# Patient Record
Sex: MALE | Race: WHITE | NOT HISPANIC OR LATINO | Employment: OTHER | URBAN - METROPOLITAN AREA
[De-identification: names, ages, dates, MRNs, and addresses within clinical notes are randomized per-mention and may not be internally consistent; named-entity substitution may affect disease eponyms.]

---

## 2023-02-24 DIAGNOSIS — K40.91 UNILATERAL RECURRENT INGUINAL HERNIA: Primary | ICD-10-CM

## 2023-03-07 ENCOUNTER — OFFICE VISIT (OUTPATIENT)
Dept: SURGERY | Facility: CLINIC | Age: 58
End: 2023-03-07
Payer: MEDICAID

## 2023-03-07 VITALS
HEIGHT: 67 IN | BODY MASS INDEX: 22.91 KG/M2 | SYSTOLIC BLOOD PRESSURE: 126 MMHG | RESPIRATION RATE: 19 BRPM | OXYGEN SATURATION: 100 % | TEMPERATURE: 98 F | HEART RATE: 71 BPM | DIASTOLIC BLOOD PRESSURE: 86 MMHG | WEIGHT: 146 LBS

## 2023-03-07 DIAGNOSIS — Z01.818 PRE-OP TESTING: ICD-10-CM

## 2023-03-07 DIAGNOSIS — K40.90 INGUINAL HERNIA OF RIGHT SIDE WITHOUT OBSTRUCTION OR GANGRENE: ICD-10-CM

## 2023-03-07 PROCEDURE — 99214 OFFICE O/P EST MOD 30 MIN: CPT | Mod: PBBFAC | Performed by: SURGERY

## 2023-03-07 RX ORDER — SODIUM CHLORIDE 9 MG/ML
INJECTION, SOLUTION INTRAVENOUS CONTINUOUS
Status: SHIPPED | OUTPATIENT
Start: 2023-03-07

## 2023-03-07 RX ORDER — ONDANSETRON 2 MG/ML
4 INJECTION INTRAMUSCULAR; INTRAVENOUS EVERY 12 HOURS PRN
Status: SHIPPED | OUTPATIENT
Start: 2023-03-07

## 2023-03-07 RX ORDER — HEPARIN SODIUM 5000 [USP'U]/ML
5000 INJECTION, SOLUTION INTRAVENOUS; SUBCUTANEOUS
Status: SHIPPED | OUTPATIENT
Start: 2023-03-07 | End: 2023-03-08

## 2023-03-07 NOTE — PROGRESS NOTES
[facility]/General Surgery  History & Physical  Chief Complaint:   R inguinal hernia     History of Present Illness:  Vik Riojas is a 57 y.o. male who presents to clinic for evaluation of R inguinal hernia. Pt first noticed it in October 2020 when he was straining during a BM and felt a tear. Hernia has progressed in size since then and is descended down into his scrotum. Pt regularly and easily reduces it. Only experiences pain and nausea when he eats food that cause him to bloat. When this happens, he says the herniation almost feels like it is caught and does not go down into his scrotum. Pt finds himself reducing or holding the hernia multiple times during the day. Occasionally has to push on the hernia when having a BM. Denies change in bowel habits/blood in stool.     Review of Systems:  Negative other than stated in HPI on 10 point review of systems.     Allergies:  Review of patient's allergies indicates:  No Known Allergies    Home Medications:  No current outpatient medications on file prior to visit.     No current facility-administered medications on file prior to visit.       Past Medical History:  History reviewed. No pertinent past medical history.    Past Surgical History:  History reviewed. No pertinent surgical history.    Family History:   History reviewed. No pertinent family history.    Social History:   Social History     Tobacco Use    Smoking status: Never     Passive exposure: Never    Smokeless tobacco: Never        Vital Signs:  Temp: 98.2 °F (36.8 °C) (03/07/23 1059)  Pulse: 71 (03/07/23 1059)  Resp: 19 (03/07/23 1059)  BP: 126/86 (03/07/23 1059)  SpO2: 100 % (03/07/23 1059)    Physical Exam:  General: well developed, well nourished, no distress  HEENT: normocephalic, atraumatic, hearing grossly normal bilaterally, mucous membranes moist, no scleral icterus  Neck: supple, symmetrical, trachea midline, no JVD  Lungs: and normal respiratory effort    Extremities: no cyanosis or  edema, or clubbing  Abdomen: soft, non-tender to palpation, no distention, no masses; bilateral inguinal hernias present R>L; R hernia descends down into scrotum; easily reducible   Skin: Skin color, texture, turgor normal. No rashes or lesions  Musculoskeletal: no clubbing, cyanosis, no deformities  Neurologic: No focal numbness or weakness  Psych/Behavioral:  Alert and oriented, appropriate affect.    Laboratory:  No results for input(s): WBC, HGB, HCT, PLT, NA, K, CL, CO2, BUN, CREATININE, BILITOT, AST, ALT, ALKPHOS, CALCIUM, ALBUMIN, PROT, MG, PHOS in the last 72 hours.      ASSESSMENT:     Vik Riojas is a 57 y.o. male who presents to clinic for evaluation of R inguinal hernia. Pt wishes to pursue operative management.    PLAN:     - plan for open inguinal hernia repair on 3/20/23  - consents obtained in clinic  - advised pt on the signs and symptoms of hernia incarceration and to go to the ED if he finds the hernia to no longer be reducible/experiencing increased abdominal pain    Jane Kelly  [facility] General Surgery  11:54 AM    RESIDENT ATTESTATION     I have seen and  evaluated the patient with the student doctor. Agree with above note and the necessary changes have been made. Agree w/ assessment and plan with following changes:     Assessment/Plan:  57 y.o. male who presents to clinic for evaluation of R inguinal hernia. Large RIH present, protruding into scrotum but easily reducible. Small left inguinal hernia also present.     Will plan for open RIHR on 3/20/23 and LIHR at  a later date.         Hunter Chang MD  PGY-1   [facility] General Surgery

## 2023-03-07 NOTE — H&P (VIEW-ONLY)
[facility]/General Surgery  History & Physical  Chief Complaint:   R inguinal hernia     History of Present Illness:  Vik Riojas is a 57 y.o. male who presents to clinic for evaluation of R inguinal hernia. Pt first noticed it in October 2020 when he was straining during a BM and felt a tear. Hernia has progressed in size since then and is descended down into his scrotum. Pt regularly and easily reduces it. Only experiences pain and nausea when he eats food that cause him to bloat. When this happens, he says the herniation almost feels like it is caught and does not go down into his scrotum. Pt finds himself reducing or holding the hernia multiple times during the day. Occasionally has to push on the hernia when having a BM. Denies change in bowel habits/blood in stool.     Review of Systems:  Negative other than stated in HPI on 10 point review of systems.     Allergies:  Review of patient's allergies indicates:  No Known Allergies    Home Medications:  No current outpatient medications on file prior to visit.     No current facility-administered medications on file prior to visit.       Past Medical History:  History reviewed. No pertinent past medical history.    Past Surgical History:  History reviewed. No pertinent surgical history.    Family History:   History reviewed. No pertinent family history.    Social History:   Social History     Tobacco Use    Smoking status: Never     Passive exposure: Never    Smokeless tobacco: Never        Vital Signs:  Temp: 98.2 °F (36.8 °C) (03/07/23 1059)  Pulse: 71 (03/07/23 1059)  Resp: 19 (03/07/23 1059)  BP: 126/86 (03/07/23 1059)  SpO2: 100 % (03/07/23 1059)    Physical Exam:  General: well developed, well nourished, no distress  HEENT: normocephalic, atraumatic, hearing grossly normal bilaterally, mucous membranes moist, no scleral icterus  Neck: supple, symmetrical, trachea midline, no JVD  Lungs: and normal respiratory effort    Extremities: no cyanosis or  edema, or clubbing  Abdomen: soft, non-tender to palpation, no distention, no masses; bilateral inguinal hernias present R>L; R hernia descends down into scrotum; easily reducible   Skin: Skin color, texture, turgor normal. No rashes or lesions  Musculoskeletal: no clubbing, cyanosis, no deformities  Neurologic: No focal numbness or weakness  Psych/Behavioral:  Alert and oriented, appropriate affect.    Laboratory:  No results for input(s): WBC, HGB, HCT, PLT, NA, K, CL, CO2, BUN, CREATININE, BILITOT, AST, ALT, ALKPHOS, CALCIUM, ALBUMIN, PROT, MG, PHOS in the last 72 hours.      ASSESSMENT:     Vik Riojas is a 57 y.o. male who presents to clinic for evaluation of R inguinal hernia. Pt wishes to pursue operative management.    PLAN:     - plan for open inguinal hernia repair on 3/20/23  - consents obtained in clinic  - advised pt on the signs and symptoms of hernia incarceration and to go to the ED if he finds the hernia to no longer be reducible/experiencing increased abdominal pain    Jane Kelly  \[facility] [place] [facility]\"" General Surgery  11:54 AM    RESIDENT ATTESTATION     I have seen and  evaluated the patient with the student doctor. Agree with above note and the necessary changes have been made. Agree w/ assessment and plan with following changes:     Assessment/Plan:  57 y.o. male who presents to clinic for evaluation of R inguinal hernia. Large RIH present, protruding into scrotum but easily reducible. Small left inguinal hernia also present.     Will plan for open RIHR on 3/20/23 and LIHR at  a later date.         Hunter Chang MD  PGY-1   [facility] General Surgery

## 2023-03-07 NOTE — PROGRESS NOTES
I have reviewed the notes, assessments, and/or procedures performed by the resident, I concur with her/his documentation of Vik Riojas.     Mary Ellen Fritz MD

## 2023-03-07 NOTE — PROGRESS NOTES
[facility]/General Surgery  History & Physical  Chief Complaint:   R inguinal hernia     History of Present Illness:  Vik Riojas is a 57 y.o. male who presents to clinic for evaluation of R inguinal hernia. Pt first noticed it in October 2020 when he was straining during a BM and felt a tear. Hernia has progressed in size since then and is descended down into his scrotum. Pt regularly and easily reduces it. Only experiences pain and nausea when he eats food that cause him to bloat. When this happens, he says the herniation almost feels like it is caught and does not go down into his scrotum. Pt finds himself reducing or holding the hernia multiple times during the day. Occasionally has to push on the hernia when having a BM. Denies change in bowel habits/blood in stool.     Review of Systems:  Negative other than stated in HPI on 10 point review of systems.     Allergies:  Review of patient's allergies indicates:  No Known Allergies    Home Medications:  No current outpatient medications on file prior to visit.     No current facility-administered medications on file prior to visit.       Past Medical History:  History reviewed. No pertinent past medical history.    Past Surgical History:  History reviewed. No pertinent surgical history.    Family History:   History reviewed. No pertinent family history.    Social History:   Social History     Tobacco Use    Smoking status: Never     Passive exposure: Never    Smokeless tobacco: Never        Vital Signs:  Temp: 98.2 °F (36.8 °C) (03/07/23 1059)  Pulse: 71 (03/07/23 1059)  Resp: 19 (03/07/23 1059)  BP: 126/86 (03/07/23 1059)  SpO2: 100 % (03/07/23 1059)    Physical Exam:  General: well developed, well nourished, no distress  HEENT: normocephalic, atraumatic, hearing grossly normal bilaterally, mucous membranes moist, no scleral icterus  Neck: supple, symmetrical, trachea midline, no JVD  Lungs: and normal respiratory effort    Extremities: no cyanosis or  edema, or clubbing  Abdomen: soft, non-tender to palpation, no distention, no masses; bilateral inguinal hernias present R>L; R hernia descends down into scrotum; easily reducible   Skin: Skin color, texture, turgor normal. No rashes or lesions  Musculoskeletal: no clubbing, cyanosis, no deformities  Neurologic: No focal numbness or weakness  Psych/Behavioral:  Alert and oriented, appropriate affect.    Laboratory:  No results for input(s): WBC, HGB, HCT, PLT, NA, K, CL, CO2, BUN, CREATININE, BILITOT, AST, ALT, ALKPHOS, CALCIUM, ALBUMIN, PROT, MG, PHOS in the last 72 hours.      ASSESSMENT:     Vik Riojas is a 57 y.o. male who presents to clinic for evaluation of R inguinal hernia. Pt wishes to pursue operative management.    PLAN:     - plan for open inguinal hernia repair on 3/20/23  - consents obtained in clinic  - advised pt on the signs and symptoms of hernia incarceration and to go to the ED if he finds the hernia to no longer be reducible/experiencing increased abdominal pain    Jane Kelly  [facility] General Surgery  11:54 AM    RESIDENT ATTESTATION     I have seen and  evaluated the patient with the student doctor. Agree with above note and the necessary changes have been made. Agree w/ assessment and plan with following changes:     Assessment/Plan:  57 y.o. male who presents to clinic for evaluation of R inguinal hernia. Large RIH present, protruding into scrotum but easily reducible. Small left inguinal hernia also present.     Will plan for open RIHR on 3/20/23 and LIHR at  a later date.         Hunter Chang MD  PGY-1   [facility] General Surgery

## 2023-03-07 NOTE — PROGRESS NOTES
Patient seen by Dr. Chnag. Patient instructed to RTC 2 weeks for post op after surgery on March 20th.

## 2023-03-15 ENCOUNTER — ANESTHESIA EVENT (OUTPATIENT)
Dept: SURGERY | Facility: HOSPITAL | Age: 58
End: 2023-03-15
Payer: MEDICAID

## 2023-03-15 NOTE — ANESTHESIA PREPROCEDURE EVALUATION
03/15/2023  Vik Riojas is a 57 y.o., male for open right inguinal hernia repair      No past medical or surgical history       Vitals:    03/20/23 0900 03/20/23 0917 03/20/23 0926 03/20/23 0930   BP:   126/83 126/83   Pulse:   90    Temp:   36.8 °C (98.2 °F)    TempSrc:   Oral    SpO2:   98%    Weight: 68.1 kg (150 lb 3.2 oz) 68.3 kg (150 lb 9.6 oz)         Social History     Socioeconomic History    Marital status:    Tobacco Use    Smoking status: Never     Passive exposure: Never    Smokeless tobacco: Never     No data found. Enter data into the [facility]  navigator to see it here..      No results found for: WBC, HGB, HCT, PLT, CHOL, TRIG, HDL, LDLDIRECT, ALT, AST, NA, K, CL, CREATININE, BUN, CO2, TSH, PSA, INR, GLUF, HGBA1C, MICROALBUR    No past surgical history on file.    Pre-op Assessment    I have reviewed the Patient Summary Reports.     I have reviewed the Nursing Notes. I have reviewed the NPO Status.   I have reviewed the Medications.     Review of Systems  Anesthesia Hx:  No problems with previous Anesthesia  History of prior surgery of interest to airway management or planning: Denies Family Hx of Anesthesia complications.   Denies Personal Hx of Anesthesia complications.   Hematology/Oncology:  Hematology Normal   Oncology Normal     EENT/Dental:EENT/Dental Normal   Cardiovascular:  Cardiovascular Normal     Pulmonary:  Pulmonary Normal    Renal/:  Renal/ Normal     Hepatic/GI:  Hepatic/GI Normal    Musculoskeletal:  Musculoskeletal Normal    Neurological:  Neurology Normal    Endocrine:  Endocrine Normal    Dermatological:  Skin Normal    Psych:  Psychiatric Normal           Physical Exam  General: Well nourished, Cooperative, Alert and Oriented    Airway:  Mallampati: I / I  Mouth Opening: Normal  TM Distance: Normal  Tongue: Normal  Neck ROM: Normal  ROM    Dental:  Intact        Anesthesia Plan  Type of Anesthesia, risks & benefits discussed:    Anesthesia Type: Gen Supraglottic Airway  Intra-op Monitoring Plan: Standard ASA Monitors  Post Op Pain Control Plan: multimodal analgesia and IV/PO Opioids PRN  Induction:  IV  Airway Plan: Direct  Informed Consent: Informed consent signed with the Patient and all parties understand the risks and agree with anesthesia plan.  All questions answered. Patient consented to blood products? No  ASA Score: 1  Day of Surgery Review of History & Physical: H&P Update referred to the surgeon/provider.    Ready For Surgery From Anesthesia Perspective.     .

## 2023-03-16 RX ORDER — POLYETHYLENE GLYCOL 3350 17 G/17G
POWDER, FOR SOLUTION ORAL
COMMUNITY
Start: 2023-03-01

## 2023-03-16 RX ORDER — BISACODYL 5 MG
5 TABLET, DELAYED RELEASE (ENTERIC COATED) ORAL DAILY PRN
COMMUNITY
Start: 2018-01-16

## 2023-03-16 RX ORDER — IBUPROFEN 100 MG/5ML
1000 SUSPENSION, ORAL (FINAL DOSE FORM) ORAL DAILY
COMMUNITY

## 2023-03-16 RX ORDER — CHOLECALCIFEROL (VITAMIN D3) 25 MCG
1000 TABLET ORAL DAILY
COMMUNITY

## 2023-03-17 ENCOUNTER — PATIENT MESSAGE (OUTPATIENT)
Dept: ADMINISTRATIVE | Facility: OTHER | Age: 58
End: 2023-03-17
Payer: MEDICAID

## 2023-03-20 ENCOUNTER — ANESTHESIA (OUTPATIENT)
Dept: SURGERY | Facility: HOSPITAL | Age: 58
End: 2023-03-20
Payer: MEDICAID

## 2023-03-20 ENCOUNTER — HOSPITAL ENCOUNTER (OUTPATIENT)
Facility: HOSPITAL | Age: 58
Discharge: HOME | End: 2023-03-20
Attending: SURGERY | Admitting: SURGERY
Payer: MEDICAID

## 2023-03-20 DIAGNOSIS — K40.90 INGUINAL HERNIA OF RIGHT SIDE WITHOUT OBSTRUCTION OR GANGRENE: Primary | ICD-10-CM

## 2023-03-20 DIAGNOSIS — K40.91 UNILATERAL RECURRENT INGUINAL HERNIA: ICD-10-CM

## 2023-03-20 DIAGNOSIS — K40.90 INGUINAL HERNIA: ICD-10-CM

## 2023-03-20 PROCEDURE — 25000003 PHARM REV CODE 250: Performed by: SPECIALIST

## 2023-03-20 PROCEDURE — 63600175 PHARM REV CODE 636 W HCPCS

## 2023-03-20 PROCEDURE — C1729 CATH, DRAINAGE: HCPCS | Performed by: SURGERY

## 2023-03-20 PROCEDURE — 36000706: Performed by: SURGERY

## 2023-03-20 PROCEDURE — 88302 TISSUE EXAM BY PATHOLOGIST: CPT | Mod: TC | Performed by: SURGERY

## 2023-03-20 PROCEDURE — 63600175 PHARM REV CODE 636 W HCPCS: Performed by: SPECIALIST

## 2023-03-20 PROCEDURE — 63600175 PHARM REV CODE 636 W HCPCS: Performed by: NURSE ANESTHETIST, CERTIFIED REGISTERED

## 2023-03-20 PROCEDURE — 25000003 PHARM REV CODE 250: Performed by: NURSE ANESTHETIST, CERTIFIED REGISTERED

## 2023-03-20 PROCEDURE — 37000008 HC ANESTHESIA 1ST 15 MINUTES: Performed by: SURGERY

## 2023-03-20 PROCEDURE — 71000033 HC RECOVERY, INTIAL HOUR: Performed by: SURGERY

## 2023-03-20 PROCEDURE — 71000016 HC POSTOP RECOV ADDL HR: Performed by: SURGERY

## 2023-03-20 PROCEDURE — 00830 ANES HERNIA RPR LWR ABD NOS: CPT | Performed by: SURGERY

## 2023-03-20 PROCEDURE — 71000015 HC POSTOP RECOV 1ST HR: Performed by: SURGERY

## 2023-03-20 PROCEDURE — 37000009 HC ANESTHESIA EA ADD 15 MINS: Performed by: SURGERY

## 2023-03-20 PROCEDURE — 36000707: Performed by: SURGERY

## 2023-03-20 PROCEDURE — C1781 MESH (IMPLANTABLE): HCPCS | Performed by: SURGERY

## 2023-03-20 PROCEDURE — 25000003 PHARM REV CODE 250: Performed by: SURGERY

## 2023-03-20 DEVICE — MESH KNITTED ST 3 X 6: Type: IMPLANTABLE DEVICE | Site: GROIN | Status: FUNCTIONAL

## 2023-03-20 RX ORDER — SODIUM CHLORIDE, SODIUM LACTATE, POTASSIUM CHLORIDE, CALCIUM CHLORIDE 600; 310; 30; 20 MG/100ML; MG/100ML; MG/100ML; MG/100ML
INJECTION, SOLUTION INTRAVENOUS CONTINUOUS
Status: ACTIVE | OUTPATIENT
Start: 2023-03-20

## 2023-03-20 RX ORDER — KETOROLAC TROMETHAMINE 30 MG/ML
INJECTION, SOLUTION INTRAMUSCULAR; INTRAVENOUS
Status: DISCONTINUED | OUTPATIENT
Start: 2023-03-20 | End: 2023-03-20

## 2023-03-20 RX ORDER — PROPOFOL 10 MG/ML
INJECTION, EMULSION INTRAVENOUS
Status: DISCONTINUED | OUTPATIENT
Start: 2023-03-20 | End: 2023-03-20

## 2023-03-20 RX ORDER — MIDAZOLAM HYDROCHLORIDE 1 MG/ML
2 INJECTION INTRAMUSCULAR; INTRAVENOUS ONCE
Status: COMPLETED | OUTPATIENT
Start: 2023-03-20 | End: 2023-03-20

## 2023-03-20 RX ORDER — HEPARIN SODIUM 5000 [USP'U]/ML
5000 INJECTION, SOLUTION INTRAVENOUS; SUBCUTANEOUS
Status: COMPLETED | OUTPATIENT
Start: 2023-03-20 | End: 2023-03-20

## 2023-03-20 RX ORDER — HYDROMORPHONE HYDROCHLORIDE 1 MG/ML
INJECTION, SOLUTION INTRAMUSCULAR; INTRAVENOUS; SUBCUTANEOUS
Status: DISCONTINUED
Start: 2023-03-20 | End: 2023-03-20 | Stop reason: HOSPADM

## 2023-03-20 RX ORDER — DIPHENHYDRAMINE HYDROCHLORIDE 50 MG/ML
25 INJECTION INTRAMUSCULAR; INTRAVENOUS ONCE AS NEEDED
Status: ACTIVE | OUTPATIENT
Start: 2023-03-20 | End: 2034-08-16

## 2023-03-20 RX ORDER — SODIUM CHLORIDE 9 MG/ML
INJECTION, SOLUTION INTRAVENOUS CONTINUOUS
Status: DISCONTINUED | OUTPATIENT
Start: 2023-03-20 | End: 2023-03-20 | Stop reason: HOSPADM

## 2023-03-20 RX ORDER — BUPIVACAINE HYDROCHLORIDE AND EPINEPHRINE 5; 5 MG/ML; UG/ML
INJECTION, SOLUTION EPIDURAL; INTRACAUDAL; PERINEURAL
Status: DISCONTINUED
Start: 2023-03-20 | End: 2023-03-20 | Stop reason: HOSPADM

## 2023-03-20 RX ORDER — PROCHLORPERAZINE EDISYLATE 5 MG/ML
5 INJECTION INTRAMUSCULAR; INTRAVENOUS ONCE AS NEEDED
Status: ACTIVE | OUTPATIENT
Start: 2023-03-20 | End: 2034-08-16

## 2023-03-20 RX ORDER — FENTANYL CITRATE 50 UG/ML
INJECTION, SOLUTION INTRAMUSCULAR; INTRAVENOUS
Status: DISCONTINUED | OUTPATIENT
Start: 2023-03-20 | End: 2023-03-20

## 2023-03-20 RX ORDER — MIDAZOLAM HYDROCHLORIDE 1 MG/ML
INJECTION INTRAMUSCULAR; INTRAVENOUS
Status: DISCONTINUED
Start: 2023-03-20 | End: 2023-03-20 | Stop reason: HOSPADM

## 2023-03-20 RX ORDER — LIDOCAINE HCL/PF 100 MG/5ML
SYRINGE (ML) INTRAVENOUS
Status: DISCONTINUED | OUTPATIENT
Start: 2023-03-20 | End: 2023-03-20

## 2023-03-20 RX ORDER — DEXAMETHASONE SODIUM PHOSPHATE 4 MG/ML
INJECTION, SOLUTION INTRA-ARTICULAR; INTRALESIONAL; INTRAMUSCULAR; INTRAVENOUS; SOFT TISSUE
Status: DISCONTINUED | OUTPATIENT
Start: 2023-03-20 | End: 2023-03-20

## 2023-03-20 RX ORDER — ONDANSETRON 2 MG/ML
4 INJECTION INTRAMUSCULAR; INTRAVENOUS ONCE
Status: ACTIVE | OUTPATIENT
Start: 2023-03-20

## 2023-03-20 RX ORDER — MEPERIDINE HYDROCHLORIDE 25 MG/ML
12.5 INJECTION INTRAMUSCULAR; INTRAVENOUS; SUBCUTANEOUS ONCE
Status: COMPLETED | OUTPATIENT
Start: 2023-03-20 | End: 2023-03-20

## 2023-03-20 RX ORDER — MEPERIDINE HYDROCHLORIDE 25 MG/ML
INJECTION INTRAMUSCULAR; INTRAVENOUS; SUBCUTANEOUS
Status: COMPLETED
Start: 2023-03-20 | End: 2023-03-20

## 2023-03-20 RX ORDER — EPHEDRINE SULFATE 50 MG/ML
INJECTION, SOLUTION INTRAVENOUS
Status: DISCONTINUED | OUTPATIENT
Start: 2023-03-20 | End: 2023-03-20

## 2023-03-20 RX ORDER — HYDROCODONE BITARTRATE AND ACETAMINOPHEN 5; 325 MG/1; MG/1
1 TABLET ORAL EVERY 6 HOURS PRN
Qty: 10 TABLET | Refills: 0 | Status: SHIPPED | OUTPATIENT
Start: 2023-03-20

## 2023-03-20 RX ORDER — HYDROMORPHONE HYDROCHLORIDE 1 MG/ML
0.2 INJECTION, SOLUTION INTRAMUSCULAR; INTRAVENOUS; SUBCUTANEOUS EVERY 5 MIN PRN
Status: ACTIVE | OUTPATIENT
Start: 2023-03-20

## 2023-03-20 RX ORDER — BUPIVACAINE HYDROCHLORIDE AND EPINEPHRINE 5; 5 MG/ML; UG/ML
INJECTION, SOLUTION EPIDURAL; INTRACAUDAL; PERINEURAL
Status: DISCONTINUED | OUTPATIENT
Start: 2023-03-20 | End: 2023-03-20 | Stop reason: HOSPADM

## 2023-03-20 RX ORDER — CEFAZOLIN SODIUM 1 G/3ML
2 INJECTION, POWDER, FOR SOLUTION INTRAMUSCULAR; INTRAVENOUS
Status: DISCONTINUED | OUTPATIENT
Start: 2023-03-20 | End: 2023-03-20 | Stop reason: HOSPADM

## 2023-03-20 RX ORDER — IPRATROPIUM BROMIDE AND ALBUTEROL SULFATE 2.5; .5 MG/3ML; MG/3ML
3 SOLUTION RESPIRATORY (INHALATION) ONCE AS NEEDED
Status: ACTIVE | OUTPATIENT
Start: 2023-03-20 | End: 2034-08-16

## 2023-03-20 RX ORDER — HYDROMORPHONE HYDROCHLORIDE 1 MG/ML
0.5 INJECTION, SOLUTION INTRAMUSCULAR; INTRAVENOUS; SUBCUTANEOUS EVERY 5 MIN PRN
Status: ACTIVE | OUTPATIENT
Start: 2023-03-20

## 2023-03-20 RX ORDER — ONDANSETRON 2 MG/ML
INJECTION INTRAMUSCULAR; INTRAVENOUS
Status: DISCONTINUED | OUTPATIENT
Start: 2023-03-20 | End: 2023-03-20

## 2023-03-20 RX ORDER — OXYCODONE AND ACETAMINOPHEN 5; 325 MG/1; MG/1
2 TABLET ORAL ONCE
Status: COMPLETED | OUTPATIENT
Start: 2023-03-20 | End: 2023-03-20

## 2023-03-20 RX ADMIN — OXYCODONE AND ACETAMINOPHEN 1 TABLET: 325; 5 TABLET ORAL at 02:03

## 2023-03-20 RX ADMIN — MEPERIDINE HYDROCHLORIDE 12.5 MG: 25 INJECTION INTRAMUSCULAR; INTRAVENOUS; SUBCUTANEOUS at 01:03

## 2023-03-20 RX ADMIN — HYDROMORPHONE HYDROCHLORIDE 0.5 MG: 1 INJECTION, SOLUTION INTRAMUSCULAR; INTRAVENOUS; SUBCUTANEOUS at 01:03

## 2023-03-20 RX ADMIN — PROPOFOL 150 MG: 10 INJECTION, EMULSION INTRAVENOUS at 11:03

## 2023-03-20 RX ADMIN — KETOROLAC TROMETHAMINE 30 MG: 30 INJECTION, SOLUTION INTRAMUSCULAR; INTRAVENOUS at 11:03

## 2023-03-20 RX ADMIN — HEPARIN SODIUM 5000 UNITS: 5000 INJECTION, SOLUTION INTRAVENOUS; SUBCUTANEOUS at 10:03

## 2023-03-20 RX ADMIN — PROPOFOL 50 MG: 10 INJECTION, EMULSION INTRAVENOUS at 11:03

## 2023-03-20 RX ADMIN — DEXAMETHASONE SODIUM PHOSPHATE 8 MG: 4 INJECTION, SOLUTION INTRA-ARTICULAR; INTRALESIONAL; INTRAMUSCULAR; INTRAVENOUS; SOFT TISSUE at 11:03

## 2023-03-20 RX ADMIN — FENTANYL CITRATE 50 MCG: 50 INJECTION, SOLUTION INTRAMUSCULAR; INTRAVENOUS at 11:03

## 2023-03-20 RX ADMIN — MIDAZOLAM HYDROCHLORIDE 2 MG: 1 INJECTION, SOLUTION INTRAMUSCULAR; INTRAVENOUS at 11:03

## 2023-03-20 RX ADMIN — LIDOCAINE HYDROCHLORIDE 50 MG: 20 INJECTION INTRAVENOUS at 11:03

## 2023-03-20 RX ADMIN — FENTANYL CITRATE 50 MCG: 50 INJECTION, SOLUTION INTRAMUSCULAR; INTRAVENOUS at 01:03

## 2023-03-20 RX ADMIN — SODIUM CHLORIDE, POTASSIUM CHLORIDE, SODIUM LACTATE AND CALCIUM CHLORIDE: 600; 310; 30; 20 INJECTION, SOLUTION INTRAVENOUS at 11:03

## 2023-03-20 RX ADMIN — EPHEDRINE SULFATE 5 MG: 50 INJECTION INTRAVENOUS at 12:03

## 2023-03-20 RX ADMIN — ONDANSETRON 4 MG: 2 INJECTION INTRAMUSCULAR; INTRAVENOUS at 12:03

## 2023-03-20 RX ADMIN — SODIUM CHLORIDE, POTASSIUM CHLORIDE, SODIUM LACTATE AND CALCIUM CHLORIDE: 600; 310; 30; 20 INJECTION, SOLUTION INTRAVENOUS at 12:03

## 2023-03-20 NOTE — OP NOTE
[facility] - Periop Services  Operative Note      Date of Procedure: 3/20/2023     Procedure: Procedure(s) (LRB):  REPAIR, HERNIA, INGUINAL, WITHOUT HISTORY OF PRIOR REPAIR, AGE 5 YEARS OR OLDER (Right)     Surgeon(s) and Role:     * Gil Perez MD - Primary     * Hunter Chang MD - Resident - Assisting    Pre-Operative Diagnosis: Right inguinal hernia     Post-Operative Diagnosis: Post-Op Diagnosis Codes:     * Unilateral recurrent inguinal hernia [K40.91]    Anesthesia: General    Operative Findings (including complications, if any):   Right inguinal Hernia     Description of Technical Procedures:     Patient prepped and draped in standard sterile fashion. A surgical time-out was performed confirming laterality (R) , procedure, perioperative antibiotics, and introductions were made.      The areas from pubic symphysis to ASIS was marked in Right groin and an incision was made with a 15 blade. The incision was carried down to the level of the external oblique aponeurosis with electrocautery. The small portion of external oblique was sharply incised and Metzenbaum scissors were used to extend incision both medially and laterally.     Contents of the inguinal canal were bluntly dissected to begin to identify cord structures. The ilioinguinal nerve was identified and protected laterally.  Cremasteric fibers and adhesions were dissected off of the external oblique aponeurosis, and a Penrose drain was used to protect and retract the cord structures laterally once the cord was bluntly circumferentially dissected.  The hernia sac was identified and  from cord structures delineating the hernia sac. Blunt dissection along the hernia sac was continued laterally to the level of the internal ring.  Hernia sac suture ligated then was easily reduced. We then used a polypropylene mesh that was cut to fit the inguinal canal.  Prolene suture was used to secure the mesh to the pubic tubercle, shelving edge,  inguinal ligament.  Proximally, near the internal ring, a slit was made in the mesh to accommodate for the exit of the spermatic cord and structures.  These were reapproximated with another Prolene suture. The external oblique aponeurosis was reapproximated with 3-0 Vicryl.  Asher's was reapproximated with an additional 3-0 Vicryl running.  4-0 Monocryl was used to closed skin in running subcuticular fashion.  Additional 10 cc of local anesthetic was injected into the skin. Dermabond placed over incision.  The patient tolerated the procedure well, was extubated and sent to PACU in stable condition.       Dr. Perez was present for all critical portions of the case.     Estimated Blood Loss (EBL): 10cc * No values recorded between 3/20/2023 11:51 AM and 3/20/2023  1:22 PM *           Implants:   Implant Name Type Inv. Item Serial No.  Lot No. LRB No. Used Action   MESH KNITTED ST 3 X 6 - [id number]  MESH KNITTED ST 3 X 6  [employer] [place] [id number] Right 1 Implanted       Specimens:   Specimen (24h ago, onward)       Start     Ordered    03/20/23 1218  Specimen to Pathology  RELEASE UPON ORDERING        References:    Click here for ordering Quick Tip   Question:  Release to patient  Answer:  Immediate    03/20/23 1218                            Condition: Good    Disposition: PACU - hemodynamically stable.

## 2023-03-20 NOTE — ANESTHESIA PROCEDURE NOTES
Intubation    Date/Time: 3/20/2023 11:38 AM  Performed by: Edith Partida CRNA  Authorized by: Bing Pinedo MD     Intubation:     Induction:  Intravenous    Intubated:  Postinduction    Mask Ventilation:  Not attempted    Attempts:  1    Attempted By:  CRNA    Difficult Airway Encountered?: No      Complications:  None    Airway Device:  Supraglottic airway/LMA    Airway Device Size:  4.0    Style/Cuff Inflation:  Uncuffed    Placement Verified By:  Capnometry    Complicating Factors:  None    Findings Post-Intubation:  BS equal bilateral and atraumatic/condition of teeth unchanged

## 2023-03-20 NOTE — ANESTHESIA POSTPROCEDURE EVALUATION
Anesthesia Post Evaluation    Patient: Vik Riojas    Procedure(s) Performed: Procedure(s) (LRB):  REPAIR, HERNIA, INGUINAL, WITHOUT HISTORY OF PRIOR REPAIR, AGE 5 YEARS OR OLDER (Right)    Final Anesthesia Type: general      Patient location during evaluation: PACU  Patient participation: Yes- Able to Participate  Level of consciousness: awake and responds to stimulation  Post-procedure vital signs: reviewed and stable  Pain management: adequate  Airway patency: patent    PONV status at discharge: No PONV  Anesthetic complications: no      Cardiovascular status: blood pressure returned to baseline  Respiratory status: unassisted  Hydration status: euvolemic  Follow-up not needed.          Vitals Value Taken Time   /78 03/20/23 1330   Temp 36.5 °C (97.7 °F) 03/20/23 1341   Pulse 96 03/20/23 1330   Resp 17 03/20/23 1340   SpO2 100 % 03/20/23 1330         No case tracking events are documented in the log.      Pain/Placido Score: Pain Rating Prior to Med Admin: 7 (3/20/2023  1:40 PM)

## 2023-03-20 NOTE — INTERVAL H&P NOTE
57 y.o. male who presents to clinic for evaluation of R inguinal hernia. Large RIH present, protruding into scrotum but easily reducible. Small left inguinal hernia also present.      To OR today for open RIHR and LIHR at  a later date.        The patient has been examined and the H&P has been reviewed:    I concur with the findings and no changes have occurred since H&P was written.    Surgery risks, benefits and alternative options discussed and understood by patient/family.          There are no hospital problems to display for this patient.

## 2023-03-20 NOTE — DISCHARGE INSTRUCTIONS
FOLLOW UP: Appointment in [facility].      PAIN: Apply ice pack to abdomen as needed for pain. Alternate Tylenol and Ibuprofen (regular over the counterfollow instructions on the bottle). Take Your prescription pain medication AS PRESCRIBED ONLY for severe pain unrelieved by Tylenol (acetaminophen) or Ibuprofen.      EXAMPLE: Take Tylenol. Three hours later take Ibuprofen. Three hours after that take Tylenol again, and   repeat until no longer needed. If Pain is still bad once you start Tylenol and Ibuprofen, add pain medication. Dont   drive or drink alcohol with pain medication. Dont take pain medication more often than it is prescribed to be taken.    INFECTION: Call your doctor at [phone] or report to the emergency room:  - if redness around your incision begins to spread, or you have swelling.   - If your incision has opened up  - If you have green, yellow, or cottage cheese-like drainage coming from your incision  - If you begin to run fever over 100.4 F  - if you are feeling weaker  - INCISION (WOUND) CARE: Leave adhesive glue on your skin until the glue peels away on its own. You may take a   shower ___tomorrow___. Keep incision clean and dry (if outside or sweating place a gauze over incision to maintain it being dry). Wash gently over incision site - do not scrub or peel skin glue. Do not soak your wounds in water   Until cleared from MD after follow up appt in 2 weeks. Do not take baths, swim, or use a hot tub until your doctor says it is okay.      NAUSEA: You can eat and drink whatever you like as tolerated. You may experience post anesthesia nausea. Apply cold   cloths to your face and neck and call your doctor for a prescription for nausea medication. If you have any uncontrolled vomiting   that is not resolving within a few hours, report to your emergency room. Resume all medications tomorrow.      ACTIVITY: Do not lift anything that is heavier than __5-10__ lbs. (4.5 kg) for __2___  weeks. Return to work when you feel well enough: your   pain is controlled without the narcotic pain medication and you feel strong enough. Do not drink alcohol or drive today, or as long   as you are on pain medication.      Notify MD of any moderate to severe pain unrelieved by pain medicine or for any signs of infection including fever above   100.4, excessive redness or swelling, yellow/green foul- smelling drainage, nausea or vomiting. Clinics number is [phone]. If it is after business hours or emergency call [phone] and state Im having post op complications and   need to speak to the surgeon on call.      Thanks for choosing [facility]! Have a smooth recovery

## 2023-03-21 VITALS
WEIGHT: 150.63 LBS | SYSTOLIC BLOOD PRESSURE: 130 MMHG | RESPIRATION RATE: 18 BRPM | BODY MASS INDEX: 23.59 KG/M2 | DIASTOLIC BLOOD PRESSURE: 85 MMHG | HEART RATE: 80 BPM | OXYGEN SATURATION: 99 % | TEMPERATURE: 98 F

## 2023-03-21 NOTE — DISCHARGE SUMMARY
[facility] - Periop Services  Discharge Note  Short Stay    Procedure(s) (LRB):  REPAIR, HERNIA, INGUINAL, WITHOUT HISTORY OF PRIOR REPAIR, AGE 5 YEARS OR OLDER (Right)      OUTCOME: Patient tolerated treatment/procedure well without complication and is now ready for discharge.    DISPOSITION: Home or Self Care    FINAL DIAGNOSIS:  <principal problem not specified>    FOLLOWUP: In clinic    DISCHARGE INSTRUCTIONS:    Discharge Procedure Orders   Diet Adult Regular     Lifting restrictions     Notify your health care provider if you experience any of the following:  temperature >100.4     Notify your health care provider if you experience any of the following:  persistent nausea and vomiting or diarrhea     Notify your health care provider if you experience any of the following:  severe uncontrolled pain     Notify your health care provider if you experience any of the following:  redness, tenderness, or signs of infection (pain, swelling, redness, odor or green/yellow discharge around incision site)     No dressing needed     Activity as tolerated     Shower on day dressing removed (No bath)         Clinical Reference Documents Added to Patient Instructions         Document    HERNIA REPAIR DISCHARGE INSTRUCTIONS (ENGLISH)            TIME SPENT ON DISCHARGE: 10 minutes

## 2023-03-22 LAB
ESTROGEN SERPL-MCNC: NORMAL PG/ML
INSULIN SERPL-ACNC: NORMAL U[IU]/ML
LAB AP CLINICAL INFORMATION: NORMAL
LAB AP GROSS DESCRIPTION: NORMAL
LAB AP REPORT FOOTNOTES: NORMAL
T3RU NFR SERPL: NORMAL %

## 2023-04-04 ENCOUNTER — OFFICE VISIT (OUTPATIENT)
Dept: SURGERY | Facility: CLINIC | Age: 58
End: 2023-04-04
Payer: MEDICAID

## 2023-04-04 VITALS
HEART RATE: 66 BPM | BODY MASS INDEX: 22.26 KG/M2 | HEIGHT: 67 IN | WEIGHT: 141.81 LBS | OXYGEN SATURATION: 100 % | SYSTOLIC BLOOD PRESSURE: 122 MMHG | DIASTOLIC BLOOD PRESSURE: 74 MMHG | RESPIRATION RATE: 18 BRPM

## 2023-04-04 DIAGNOSIS — K40.90 INGUINAL HERNIA OF RIGHT SIDE WITHOUT OBSTRUCTION OR GANGRENE: Primary | ICD-10-CM

## 2023-04-04 PROCEDURE — 99213 OFFICE O/P EST LOW 20 MIN: CPT | Mod: PBBFAC

## 2023-04-04 NOTE — PROGRESS NOTES
[facility]  Progress Note    SUBJECTIVE:   Chief Complaint: RIHR    HPI:  Mr. Riojas is a 57 y.o. male who underwent open RIHR with mesh on 3/20. He is doing well, tolerating PO, no nausea or vomiting, doing light activity, not requiring pain meds.    OBJECTIVE:   Vital Signs:  Pulse: 66 (04/04/23 1037)  Resp: 18 (04/04/23 1037)  BP: 122/74 (04/04/23 1037)  SpO2: 100 % (04/04/23 1037)    Physical Exam:  General: well developed, well nourished, no distress  Lungs: Normal WOB, No SOB  Cardiovascular: regular rate  Abdomen: soft, nondistended, nontender to palpation  Musculoskeletal: Right groin incision well healed. Expected swelling. No dehiscence.    Results:  Final Diagnosis      Right inguinal hernia sac, excision:   - Hernia sac.       ASSESSMENT:   56 y/o man s/p open RIHR    PLAN:   -Healing well. RTC PRN.  -Light activity for 2 more weeks.  -Reportedly has a left inguinal hernia. Will return PRN for repair if desires.    Alexis Hale MD   Surgery, PGY-5

## (undated) DEVICE — SUT SA85H SILK 2-0

## (undated) DEVICE — GLOVE PROTEXIS HYDROGEL SZ6.5

## (undated) DEVICE — SUT 2-0 VICRYL / SH (J417)

## (undated) DEVICE — MANIFOLD 4 PORT

## (undated) DEVICE — GLOVE PROTEXIS PI SYN SURG 7.5

## (undated) DEVICE — SUT ABSRB PDS II 2-0 SH 27IN

## (undated) DEVICE — NDL HYPO REG 25G X 1 1/2

## (undated) DEVICE — SUT PDSII 4-0 PS-2 CLEAR MO

## (undated) DEVICE — APPLICATOR CHLORAPREP ORN 26ML

## (undated) DEVICE — CLIPPER BLADE MOD 4406 (CAREF)

## (undated) DEVICE — GLOVE PROTEXIS BLUE LATEX 7

## (undated) DEVICE — GOWN POLY REINF BRTH SLV XL

## (undated) DEVICE — SUT 3-0 VICRYL / SH (J416)

## (undated) DEVICE — ADHESIVE DERMABOND ADVANCED

## (undated) DEVICE — SYR 10CC LUER LOCK

## (undated) DEVICE — GLOVE PROTEXIS HYDROGEL SZ7

## (undated) DEVICE — KIT SURGICAL TURNOVER

## (undated) DEVICE — GLOVE PROTEXIS PI SYN SURG 7

## (undated) DEVICE — DRAIN PENRS STERILE LTX 18X1/2

## (undated) DEVICE — SPONGE KITTNER 1/4X 5/8 L STRL

## (undated) DEVICE — SOL 9P NACL IRR PIC IL

## (undated) DEVICE — SUT 2/0 30IN SILK BLK BRAI

## (undated) DEVICE — HANDLE DEVON RIGID OR LIGHT

## (undated) DEVICE — GLOVE PROTEXIS HYDROGEL SZ7.5

## (undated) DEVICE — Device

## (undated) DEVICE — GLOVE PROTEXIS BLUE LATEX 7.5